# Patient Record
Sex: MALE | Race: WHITE | Employment: FULL TIME | URBAN - METROPOLITAN AREA
[De-identification: names, ages, dates, MRNs, and addresses within clinical notes are randomized per-mention and may not be internally consistent; named-entity substitution may affect disease eponyms.]

---

## 2019-06-13 ENCOUNTER — HOSPITAL ENCOUNTER (EMERGENCY)
Age: 64
Discharge: HOME | End: 2019-06-13
Attending: EMERGENCY MEDICINE

## 2019-06-13 VITALS
DIASTOLIC BLOOD PRESSURE: 92 MMHG | RESPIRATION RATE: 18 BRPM | BODY MASS INDEX: 24.33 KG/M2 | WEIGHT: 155 LBS | SYSTOLIC BLOOD PRESSURE: 195 MMHG | TEMPERATURE: 98.1 F | HEART RATE: 75 BPM | OXYGEN SATURATION: 98 % | HEIGHT: 67 IN

## 2019-06-13 DIAGNOSIS — T63.301A SPIDER BITE WOUND, ACCIDENTAL OR UNINTENTIONAL, INITIAL ENCOUNTER: ICD-10-CM

## 2019-06-13 DIAGNOSIS — L03.211 FACIAL CELLULITIS: Primary | ICD-10-CM

## 2019-06-13 PROCEDURE — 99281 EMR DPT VST MAYX REQ PHY/QHP: CPT

## 2019-06-13 RX ORDER — CLINDAMYCIN HYDROCHLORIDE 300 MG/1
300 CAPSULE ORAL 4 TIMES DAILY
Qty: 20 CAPSULE | Refills: 0 | Status: SHIPPED | OUTPATIENT
Start: 2019-06-13 | End: 2019-06-18

## 2019-06-13 ASSESSMENT — ENCOUNTER SYMPTOMS: FACIAL SWELLING: 1

## 2019-06-13 ASSESSMENT — PAIN DESCRIPTION - DESCRIPTORS: DESCRIPTORS: ACHING

## 2019-06-13 ASSESSMENT — PAIN DESCRIPTION - LOCATION: LOCATION: FACE

## 2019-06-13 ASSESSMENT — PAIN DESCRIPTION - ORIENTATION: ORIENTATION: LEFT

## 2019-06-13 NOTE — ED PROVIDER NOTES
[address] [facility] ED  eMERGENCY dEPARTMENT eNCOUnter      Pt Name: Jaguar Peralta  MRN: [id number]  [place] 1955  Date of evaluation: 6/13/2019  Provider: Cecillia Fabry, MD    CHIEF COMPLAINT       Chief Complaint   Patient presents with    Insect Bite     left side of face x5 days         HISTORY OF PRESENT ILLNESS   (Location/Symptom, Timing/Onset,Context/Setting, Quality, Duration, Modifying Factors, Severity)  Note limiting factors. Jaguar Peralta is a 61 y.o. male who presents to the emergency department with swollen red area to the left side of face right at the angle of the jaw. Patient is visiting from [facility] and was at his home when he woke with some redness and irritation in the area 5 days ago. It has become more swollen and red over time although stabilized with the past day. He has a small ulcerated area and wound discharge from it. He assumes it is from a insect bite possibly spider but was not aware of the bite at the time so is not sure what caused it. No other skin rash or complaints. The history is provided by the patient. NursingNotes were reviewed. REVIEW OF SYSTEMS    (2-9 systems for level 4, 10 or more for level 5)     Review of Systems   Constitutional: Negative for fever. HENT: Positive for facial swelling. Skin: Positive for wound. Hematological: Negative for adenopathy. Except as noted above the remainder of the review of systems was reviewed and negative. PAST MEDICAL HISTORY   History reviewed. No pertinent past medical history. SURGICALHISTORY     History reviewed. No pertinent surgical history. CURRENT MEDICATIONS       Previous Medications    No medications on file       ALLERGIES     Cephalosporins and Pcn [penicillins]    FAMILY HISTORY     History reviewed. No pertinent family history.        SOCIAL HISTORY       Social History     Socioeconomic History    Marital status: None     Spouse name: None    Number of children: None    Years of education: None    Highest education level: None   Occupational History    None   Social Needs    Financial resource strain: None    Food insecurity:     Worry: None     Inability: None    Transportation needs:     Medical: None     Non-medical: None   Tobacco Use    Smoking status: Never Smoker    Smokeless tobacco: Never Used   Substance and Sexual Activity    Alcohol use: Not Currently    Drug use: Never    Sexual activity: Yes     Partners: Female   Lifestyle    Physical activity:     Days per week: None     Minutes per session: None    Stress: None   Relationships    Social connections:     Talks on phone: None     Gets together: None     Attends [employer] service: None     Active member of club or organization: None     Attends meetings of clubs or organizations: None     Relationship status: None    Intimate partner violence:     Fear of current or ex partner: None     Emotionally abused: None     Physically abused: None     Forced sexual activity: None   Other Topics Concern    None   Social History Narrative    None       SCREENINGS      @FLOW([id number])@      PHYSICAL EXAM    (up to 7 for level 4, 8 or more for level 5)     ED Triage Vitals   BP Temp Temp Source Pulse Resp SpO2 Height Weight   06/13/19 1103 06/13/19 1103 06/13/19 1103 06/13/19 1103 06/13/19 1103 06/13/19 1103 06/13/19 1100 06/13/19 1100   (!) 195/92 98.1 °F (36.7 °C) Oral 75 18 98 % 5' 7\" (1.702 m) 155 lb (70.3 kg)       Physical Exam  This is a 70-year-old male without distress. There is a 3 cm diameter area of redness induration and warmth to touch to the left side of the face right at the angle of the jaw. There is small ulcerated lesion in the center with minimal discharge. No fluctuance or abscess. No lymphadenopathy. No skin rash or lesions. Patient is awake alert and appropriate. Patient moves all extremities symmetrically without focal weakness or sensory deficit.   Speech pattern and cranial nerves appear to be intact. No focal neurologic deficit. Mood appears normal.    DIAGNOSTIC RESULTS     EKG: All EKG's are interpreted by the Emergency Department Physician who either signs or Co-signsthis chart in the absence of a cardiologist.        RADIOLOGY:   Randy Landau such as CT, Ultrasound and MRI are read by the radiologist. Plain radiographic images are visualized and preliminarily interpreted by the emergency physician with the below findings:        Interpretation per the Radiologist below, if available at the time ofthis note:    No orders to display         ED BEDSIDE ULTRASOUND:   Performed by ED Physician - none    LABS:  Labs Reviewed - No data to display    All other labs were within normal range or not returned as of this dictation. EMERGENCY DEPARTMENT COURSE and DIFFERENTIAL DIAGNOSIS/MDM:   Vitals:    Vitals:    06/13/19 1100 06/13/19 1103   BP:  (!) 195/92   Pulse:  75   Resp:  18   Temp:  98.1 °F (36.7 °C)   TempSrc:  Oral   SpO2:  98%   Weight: 155 lb (70.3 kg)    Height: 5' 7\" (1.702 m)        Findings consistent consistent with spider bite with some cellulitis with persistent redness swelling and warmth to touch. Patient treated with clindamycin for 5 days. Warm compresses. Patient expressed understanding at time of discharge. MDM    CRITICAL CARE TIME   Total Critical Care time was  minutes, excluding separately reportableprocedures. There was a high probability of clinicallysignificant/life threatening deterioration in the patient's condition which required my urgent intervention. CONSULTS:  None    PROCEDURES:  Unless otherwise noted below, none     Procedures    FINAL IMPRESSION      1. Facial cellulitis    2.  Spider bite wound, accidental or unintentional, initial encounter        DISPOSITION/PLAN   DISPOSITION Decision To Discharge 06/13/2019 11:18:10 AM      PATIENT REFERRED TO:  [facility]  [address] [place] [address] [address]:  New Prescriptions    CLINDAMYCIN (CLEOCIN) 300 MG CAPSULE    Take 1 capsule by mouth 4 times daily for 5 days          (Please note that portions of this note were completed with a voice recognitionprogram.  Efforts were made to edit the dictations but occasionally words are mis-transcribed.)    Corinne Bowling, MD (electronically signed)  Attending Emergency Physician        Blas Overton MD  06/13/19 [phone]

## 2019-06-13 NOTE — ED TRIAGE NOTES
Patient in town from [facility] noticed an insect bite to his left cheek 5 days ago redness and swelling has not gotten any better so he decided to come in and get checked out.